# Patient Record
Sex: MALE | Race: ASIAN | NOT HISPANIC OR LATINO | URBAN - METROPOLITAN AREA
[De-identification: names, ages, dates, MRNs, and addresses within clinical notes are randomized per-mention and may not be internally consistent; named-entity substitution may affect disease eponyms.]

---

## 2024-09-24 ENCOUNTER — OUTPATIENT (OUTPATIENT)
Dept: OUTPATIENT SERVICES | Facility: HOSPITAL | Age: 79
LOS: 1 days | End: 2024-09-24
Payer: MEDICARE

## 2024-09-24 ENCOUNTER — APPOINTMENT (OUTPATIENT)
Dept: VASCULAR SURGERY | Facility: CLINIC | Age: 79
End: 2024-09-24
Payer: MEDICARE

## 2024-09-24 VITALS
BODY MASS INDEX: 23.23 KG/M2 | SYSTOLIC BLOOD PRESSURE: 167 MMHG | HEIGHT: 67 IN | HEART RATE: 81 BPM | DIASTOLIC BLOOD PRESSURE: 79 MMHG | WEIGHT: 148 LBS

## 2024-09-24 DIAGNOSIS — I71.43 INFRARENAL ABDOMINAL AORTIC ANEURYSM, WITHOUT RUPTURE: ICD-10-CM

## 2024-09-24 PROBLEM — Z00.00 ENCOUNTER FOR PREVENTIVE HEALTH EXAMINATION: Status: ACTIVE | Noted: 2024-09-24

## 2024-09-24 PROCEDURE — 93005 ELECTROCARDIOGRAM TRACING: CPT

## 2024-09-24 PROCEDURE — 93010 ELECTROCARDIOGRAM REPORT: CPT

## 2024-09-24 PROCEDURE — 99204 OFFICE O/P NEW MOD 45 MIN: CPT

## 2024-09-25 LAB
ALBUMIN SERPL ELPH-MCNC: 4.6 G/DL
ALP BLD-CCNC: 61 U/L
ALT SERPL-CCNC: 12 U/L
ANION GAP SERPL CALC-SCNC: 12 MMOL/L
APTT BLD: 29.8 SEC
AST SERPL-CCNC: 20 U/L
BILIRUB SERPL-MCNC: 0.5 MG/DL
BUN SERPL-MCNC: 24 MG/DL
CALCIUM SERPL-MCNC: 10 MG/DL
CHLORIDE SERPL-SCNC: 108 MMOL/L
CO2 SERPL-SCNC: 26 MMOL/L
CREAT SERPL-MCNC: 0.85 MG/DL
EGFR: 88 ML/MIN/1.73M2
GLUCOSE SERPL-MCNC: 107 MG/DL
HCT VFR BLD CALC: 43 %
HGB BLD-MCNC: 13.5 G/DL
INR PPP: 1 RATIO
MCHC RBC-ENTMCNC: 31.4 GM/DL
MCHC RBC-ENTMCNC: 31.9 PG
MCV RBC AUTO: 101.7 FL
PLATELET # BLD AUTO: 148 K/UL
POTASSIUM SERPL-SCNC: 5.6 MMOL/L
PROT SERPL-MCNC: 7.2 G/DL
PT BLD: 11.9 SEC
RBC # BLD: 4.23 M/UL
RBC # FLD: 13.3 %
SODIUM SERPL-SCNC: 146 MMOL/L
WBC # FLD AUTO: 5.82 K/UL

## 2024-09-25 RX ORDER — CRANBERRY FRUIT EXTRACT 200 MG
CAPSULE ORAL
Refills: 0 | Status: ACTIVE | COMMUNITY

## 2024-09-25 NOTE — HISTORY OF PRESENT ILLNESS
[FreeTextEntry1] : 79yoM w/PMHx of prostate CA (evaluated w/annual PET/CT scans), HTN (not treated w/meds), TB infection as a child, no smoking hx, referred by Dr. Gomez for evaluation of an infrarenal AAA that has demonstrated growth over the past 2y based on PET/CT, CT, and duplex.  Pt denies any abd/back pain and reports no issues w/ambulating long distances/negotiating stairs/hills.  Pt states that the aneurysm was previously measured at 2cm in 2017 but has now grown to 4.4cm on duplex in September 2024.

## 2024-09-25 NOTE — ADDENDUM
[FreeTextEntry1] : This note was written by Syd Heath, acting as a scribe for Dr. Berna Rodriguez.  I, Dr. Berna Rodriguez, have read and attest that all the information, medical decision-making, and discharge instructions within are true and accurate.  I, Dr. Berna Rodriguez, personally performed the evaluation and management (E/M) services for this new patient.  That E/M includes conducting the initial examination, assessing all conditions, and establishing the plan of care.  Today, my ACP, Syd Heath, was here to observe my evaluation and management services for this patient to be followed going forward.

## 2024-09-25 NOTE — ASSESSMENT
[FreeTextEntry1] : 79yoM w/PMHx of prostate CA (evaluated w/annual PET/CT scans), HTN (not treated w/meds), TB infection as a child, no smoking hx, referred by Dr. Gomez for evaluation of an infrarenal AAA that has demonstrated growth over the past 2y based on PET/CT, CT, and duplex.  Pt denies any abd/back pain and reports no issues w/ambulating long distances/negotiating stairs/hills.  Pt states that the aneurysm was previously measured at 2cm in 2017 but has now grown to 4.4cm on duplex in September 2024.  Nontender, pulsatile mass noted on exam today w/strong palpable LE pulses.  Discussed options w/pt as his aneurysm has demonstrated growth on serial imaging studies.  While aneurysm does not meet size criteria for repair, it has grown approximately 2.5cm over the past 7y and 5mm over the past year.  Explained to pt that his aneurysm is amenable to endovascular repair under GETA, which would likely provide the lowest-risk option to the pt.  He will discuss repair w/Dr. Gomez who states that pt is a good surgical candidate from a vascular standpoint and contact our office w/a decision to proceed.

## 2024-09-25 NOTE — REASON FOR VISIT
[Consultation] : a consultation visit [Spouse] : spouse [Source: ______] : History obtained from [unfilled] [FreeTextEntry1] : Asymptomatic AAA w/growth

## 2024-10-06 NOTE — H&P ADULT - NSICDXPASTMEDICALHX_GEN_ALL_CORE_FT
PAST MEDICAL HISTORY:  AAA (abdominal aortic aneurysm)     HTN (hypertension)     Prostate cancer

## 2024-10-06 NOTE — H&P ADULT - ASSESSMENT
79yoM w/PMHx of prostate CA, HTN, remote TB infection, 4.4cm AAA. Presents today for EVAR    Plan  Admit to Dr. Rodriguez, Telemetry  Ancef keira-op 24hrs  CLD, adv if tolerating  Flat for 2 hours, bedrest overnight  d/c barlow at midnight  SQH if groins soft

## 2024-10-06 NOTE — H&P ADULT - HISTORY OF PRESENT ILLNESS
79yoM w/PMHx of prostate CA (evaluated w/annual PET/CT scans), HTN (not treated w/meds), TB infection as a child, no smoking hx, referred by Dr. Gomez for evaluation of an infrarenal AAA that has demonstrated growth over the past 2y based on PET/CT, CT, and duplex. Pt denies any abd/back pain and reports no issues w/ambulating long distances/negotiating stairs/hills. Pt states that the aneurysm was previously measured at 2cm in 2017 but has now grown to 4.4cm on duplex in September 2024. Nontender, pulsatile mass noted on exam today w/strong palpable LE pulses. Discussed options w/pt as his aneurysm has demonstrated growth on serial imaging studies. While aneurysm does not meet size criteria for repair, it has grown approximately 2.5cm over the past 7y and 5mm over the past year. Today he presents for EVAR.

## 2024-10-07 ENCOUNTER — APPOINTMENT (OUTPATIENT)
Dept: VASCULAR SURGERY | Facility: HOSPITAL | Age: 79
End: 2024-10-07

## 2024-10-07 ENCOUNTER — INPATIENT (INPATIENT)
Facility: HOSPITAL | Age: 79
LOS: 0 days | Discharge: HOME | End: 2024-10-08
Attending: SURGERY | Admitting: SURGERY
Payer: MEDICARE

## 2024-10-07 VITALS — HEIGHT: 67 IN | WEIGHT: 147.93 LBS

## 2024-10-07 LAB
ANION GAP SERPL CALC-SCNC: 9 MMOL/L — SIGNIFICANT CHANGE UP (ref 5–17)
APTT BLD: 30 SEC — SIGNIFICANT CHANGE UP (ref 24.5–35.6)
BLD GP AB SCN SERPL QL: NEGATIVE — SIGNIFICANT CHANGE UP
BUN SERPL-MCNC: 16 MG/DL — SIGNIFICANT CHANGE UP (ref 7–23)
CALCIUM SERPL-MCNC: 8.1 MG/DL — LOW (ref 8.4–10.5)
CHLORIDE SERPL-SCNC: 109 MMOL/L — HIGH (ref 96–108)
CO2 SERPL-SCNC: 23 MMOL/L — SIGNIFICANT CHANGE UP (ref 22–31)
CREAT SERPL-MCNC: 0.7 MG/DL — SIGNIFICANT CHANGE UP (ref 0.5–1.3)
EGFR: 93 ML/MIN/1.73M2 — SIGNIFICANT CHANGE UP
GLUCOSE SERPL-MCNC: 107 MG/DL — HIGH (ref 70–99)
HCT VFR BLD CALC: 35.2 % — LOW (ref 39–50)
HGB BLD-MCNC: 11.5 G/DL — LOW (ref 13–17)
INR BLD: 1.14 — SIGNIFICANT CHANGE UP (ref 0.85–1.16)
ISTAT ACTK (ACTIVATED CLOTTING TIME KAOLIN): 207 SEC — HIGH (ref 74–137)
ISTAT ACTK (ACTIVATED CLOTTING TIME KAOLIN): 220 SEC — HIGH (ref 74–137)
ISTAT ACTK (ACTIVATED CLOTTING TIME KAOLIN): 238 SEC — HIGH (ref 74–137)
MAGNESIUM SERPL-MCNC: 1.9 MG/DL — SIGNIFICANT CHANGE UP (ref 1.6–2.6)
MCHC RBC-ENTMCNC: 30.9 PG — SIGNIFICANT CHANGE UP (ref 27–34)
MCHC RBC-ENTMCNC: 32.7 GM/DL — SIGNIFICANT CHANGE UP (ref 32–36)
MCV RBC AUTO: 94.6 FL — SIGNIFICANT CHANGE UP (ref 80–100)
NRBC # BLD: 0 /100 WBCS — SIGNIFICANT CHANGE UP (ref 0–0)
PHOSPHATE SERPL-MCNC: 2.6 MG/DL — SIGNIFICANT CHANGE UP (ref 2.5–4.5)
PLATELET # BLD AUTO: 149 K/UL — LOW (ref 150–400)
POTASSIUM SERPL-MCNC: 4.2 MMOL/L — SIGNIFICANT CHANGE UP (ref 3.5–5.3)
POTASSIUM SERPL-SCNC: 4.2 MMOL/L — SIGNIFICANT CHANGE UP (ref 3.5–5.3)
PROTHROM AB SERPL-ACNC: 13.1 SEC — SIGNIFICANT CHANGE UP (ref 9.9–13.4)
RBC # BLD: 3.72 M/UL — LOW (ref 4.2–5.8)
RBC # FLD: 12.3 % — SIGNIFICANT CHANGE UP (ref 10.3–14.5)
RH IG SCN BLD-IMP: POSITIVE — SIGNIFICANT CHANGE UP
SODIUM SERPL-SCNC: 141 MMOL/L — SIGNIFICANT CHANGE UP (ref 135–145)
WBC # BLD: 7.11 K/UL — SIGNIFICANT CHANGE UP (ref 3.8–10.5)
WBC # FLD AUTO: 7.11 K/UL — SIGNIFICANT CHANGE UP (ref 3.8–10.5)

## 2024-10-07 PROCEDURE — 34705 EVAC RPR A-BIILIAC NDGFT: CPT | Mod: GC

## 2024-10-07 PROCEDURE — 34713 PERQ ACCESS & CLSR FEM ART: CPT | Mod: 50,GC

## 2024-10-07 RX ORDER — ATORVASTATIN CALCIUM 10 MG/1
20 TABLET, FILM COATED ORAL AT BEDTIME
Refills: 0 | Status: DISCONTINUED | OUTPATIENT
Start: 2024-10-07 | End: 2024-10-08

## 2024-10-07 RX ORDER — ONDANSETRON HCL/PF 4 MG/2 ML
4 VIAL (ML) INJECTION ONCE
Refills: 0 | Status: DISCONTINUED | OUTPATIENT
Start: 2024-10-07 | End: 2024-10-08

## 2024-10-07 RX ORDER — OXYCODONE HYDROCHLORIDE 30 MG/1
5 TABLET, FILM COATED, EXTENDED RELEASE ORAL EVERY 6 HOURS
Refills: 0 | Status: DISCONTINUED | OUTPATIENT
Start: 2024-10-07 | End: 2024-10-08

## 2024-10-07 RX ORDER — ASPIRIN 325 MG
81 TABLET ORAL DAILY
Refills: 0 | Status: DISCONTINUED | OUTPATIENT
Start: 2024-10-07 | End: 2024-10-08

## 2024-10-07 RX ORDER — ACETAMINOPHEN 325 MG
650 TABLET ORAL EVERY 6 HOURS
Refills: 0 | Status: DISCONTINUED | OUTPATIENT
Start: 2024-10-07 | End: 2024-10-08

## 2024-10-07 RX ORDER — FENTANYL CITRATE-0.9 % NACL/PF 300MCG/30
25 PATIENT CONTROLLED ANALGESIA VIAL INJECTION
Refills: 0 | Status: DISCONTINUED | OUTPATIENT
Start: 2024-10-07 | End: 2024-10-08

## 2024-10-07 RX ORDER — CEFAZOLIN SODIUM 1 G
2000 VIAL (EA) INJECTION EVERY 8 HOURS
Refills: 0 | Status: COMPLETED | OUTPATIENT
Start: 2024-10-07 | End: 2024-10-08

## 2024-10-07 RX ORDER — INFLUENZA VIRUS VACCINE 15; 15; 15; 15 UG/.5ML; UG/.5ML; UG/.5ML; UG/.5ML
0.5 SUSPENSION INTRAMUSCULAR ONCE
Refills: 0 | Status: DISCONTINUED | OUTPATIENT
Start: 2024-10-07 | End: 2024-10-08

## 2024-10-07 RX ORDER — SODIUM CHLORIDE IRRIG SOLUTION 0.9 %
1000 SOLUTION, IRRIGATION IRRIGATION
Refills: 0 | Status: DISCONTINUED | OUTPATIENT
Start: 2024-10-07 | End: 2024-10-07

## 2024-10-07 RX ADMIN — Medication 100 MILLILITER(S): at 19:24

## 2024-10-07 RX ADMIN — Medication 81 MILLIGRAM(S): at 22:02

## 2024-10-07 RX ADMIN — Medication 100 MILLIGRAM(S): at 23:44

## 2024-10-07 RX ADMIN — Medication 25 MICROGRAM(S): at 20:10

## 2024-10-07 RX ADMIN — ATORVASTATIN CALCIUM 20 MILLIGRAM(S): 10 TABLET, FILM COATED ORAL at 22:02

## 2024-10-07 RX ADMIN — Medication 25 MICROGRAM(S): at 19:38

## 2024-10-07 NOTE — BRIEF OPERATIVE NOTE - NSICDXBRIEFPREOP_GEN_ALL_CORE_FT
PRE-OP DIAGNOSIS:  History of abdominal aortic aneurysm (AAA) 07-Oct-2024 20:40:12  Henrique Lanier

## 2024-10-07 NOTE — PRE-ANESTHESIA EVALUATION ADULT - NSANTHADDINFOFT_GEN_ALL_CORE
Blood products transfusion, Arterial line, possible central line, possible post-[id number] ventilator support discussed

## 2024-10-07 NOTE — PATIENT PROFILE ADULT - FALL HARM RISK - HARM RISK INTERVENTIONS

## 2024-10-07 NOTE — BRIEF OPERATIVE NOTE - OPERATION/FINDINGS
EVAR: Terumo Treo Device  Bilateral groin access L: 12Fr (perclosed x 2) R: 18Fr (perclosed x 2)  Terumo Treo Limbs: Ipsi/Contra: 15mm x 100mm x2   Extension Cuff: 24mm x 40  Main Body: 24mm x 110  No endoloeak at completion of case.   Palpable pulses

## 2024-10-07 NOTE — PROGRESS NOTE ADULT - SUBJECTIVE AND OBJECTIVE BOX
Vascular Surgery Post-Op Note    Procedure: EVAR    Diagnosis/Indication: AAA    Surgeon: Dr. Rodriguez    S: Pt has no complaints. Denies CP, SOB, DYER, calf tenderness. Pain controlled with medication.    O:  T(C): 36.5 (10-07-24 @ 18:13), Max: 36.5 (10-07-24 @ 18:13)  T(F): 97.7 (10-07-24 @ 18:13), Max: 97.7 (10-07-24 @ 18:13)  HR: 62 (10-07-24 @ 20:55) (52 - 65)  BP: 144/68 (10-07-24 @ 20:55) (70/- - 158/72)  RR: 15 (10-07-24 @ 20:55) (15 - 17)  SpO2: 98% (10-07-24 @ 20:55) (97% - 100%)  Wt(kg): --                        11.5   7.11  )-----------( 149      ( 07 Oct 2024 18:27 )             35.2     10-07    141  |  109[H]  |  16  ----------------------------<  107[H]  4.2   |  23  |  0.70    Ca    8.1[L]      07 Oct 2024 18:27  Phos  2.6     10-07  Mg     1.9     10-07        Gen: NAD, resting comfortably in bed  C/V: NSR  Pulm: Nonlabored breathing, no respiratory distress, on room air  Abd: soft, NT/ND  Extrem: WWP, no calf edema, b/l groin dressings c/d/i, soft, no palpable hematoms  Pulses: palpable DP/PT bilaterally      A/P: 79yMale s/p above procedure  bedrest overnight  dc barlow at MN    Diet: CLD  IVF: LR at 100cc/hr  Pain/nausea control  DVT ppx: holding will restart in AM  Dispo plan: 5 uris

## 2024-10-07 NOTE — PACU DISCHARGE NOTE - NAUSEA/VOMITING:
Dr. Gonda notified of pt's Na of 117. Fluids switched from NS@75 to 1/2NS @ 75 and sliding scale insulin protocol initiated.   None

## 2024-10-07 NOTE — PRE-ANESTHESIA EVALUATION ADULT - NSANTHOSAYNRD_GEN_A_CORE
No. NAEL screening performed.  STOP BANG Legend: 0-2 = LOW Risk; 3-4 = INTERMEDIATE Risk; 5-8 = HIGH Risk

## 2024-10-08 ENCOUNTER — TRANSCRIPTION ENCOUNTER (OUTPATIENT)
Age: 79
End: 2024-10-08

## 2024-10-08 VITALS
OXYGEN SATURATION: 96 % | DIASTOLIC BLOOD PRESSURE: 68 MMHG | RESPIRATION RATE: 16 BRPM | SYSTOLIC BLOOD PRESSURE: 147 MMHG | HEART RATE: 77 BPM

## 2024-10-08 PROBLEM — I10 ESSENTIAL (PRIMARY) HYPERTENSION: Chronic | Status: ACTIVE | Noted: 2024-10-06

## 2024-10-08 PROBLEM — C61 MALIGNANT NEOPLASM OF PROSTATE: Chronic | Status: ACTIVE | Noted: 2024-10-06

## 2024-10-08 LAB
ANION GAP SERPL CALC-SCNC: 13 MMOL/L — SIGNIFICANT CHANGE UP (ref 5–17)
BUN SERPL-MCNC: 20 MG/DL — SIGNIFICANT CHANGE UP (ref 7–23)
CALCIUM SERPL-MCNC: 8.8 MG/DL — SIGNIFICANT CHANGE UP (ref 8.4–10.5)
CHLORIDE SERPL-SCNC: 106 MMOL/L — SIGNIFICANT CHANGE UP (ref 96–108)
CO2 SERPL-SCNC: 22 MMOL/L — SIGNIFICANT CHANGE UP (ref 22–31)
CREAT SERPL-MCNC: 0.76 MG/DL — SIGNIFICANT CHANGE UP (ref 0.5–1.3)
EGFR: 91 ML/MIN/1.73M2 — SIGNIFICANT CHANGE UP
GLUCOSE SERPL-MCNC: 115 MG/DL — HIGH (ref 70–99)
HCT VFR BLD CALC: 35 % — LOW (ref 39–50)
HGB BLD-MCNC: 11.7 G/DL — LOW (ref 13–17)
MAGNESIUM SERPL-MCNC: 1.9 MG/DL — SIGNIFICANT CHANGE UP (ref 1.6–2.6)
MCHC RBC-ENTMCNC: 32.6 PG — SIGNIFICANT CHANGE UP (ref 27–34)
MCHC RBC-ENTMCNC: 33.4 GM/DL — SIGNIFICANT CHANGE UP (ref 32–36)
MCV RBC AUTO: 97.5 FL — SIGNIFICANT CHANGE UP (ref 80–100)
NRBC # BLD: 0 /100 WBCS — SIGNIFICANT CHANGE UP (ref 0–0)
PHOSPHATE SERPL-MCNC: 3.7 MG/DL — SIGNIFICANT CHANGE UP (ref 2.5–4.5)
PLATELET # BLD AUTO: 138 K/UL — LOW (ref 150–400)
POTASSIUM SERPL-MCNC: 3.9 MMOL/L — SIGNIFICANT CHANGE UP (ref 3.5–5.3)
POTASSIUM SERPL-SCNC: 3.9 MMOL/L — SIGNIFICANT CHANGE UP (ref 3.5–5.3)
RBC # BLD: 3.59 M/UL — LOW (ref 4.2–5.8)
RBC # FLD: 12.3 % — SIGNIFICANT CHANGE UP (ref 10.3–14.5)
SODIUM SERPL-SCNC: 141 MMOL/L — SIGNIFICANT CHANGE UP (ref 135–145)
WBC # BLD: 7.62 K/UL — SIGNIFICANT CHANGE UP (ref 3.8–10.5)
WBC # FLD AUTO: 7.62 K/UL — SIGNIFICANT CHANGE UP (ref 3.8–10.5)

## 2024-10-08 PROCEDURE — 99233 SBSQ HOSP IP/OBS HIGH 50: CPT

## 2024-10-08 RX ORDER — ATORVASTATIN CALCIUM 10 MG/1
1 TABLET, FILM COATED ORAL
Qty: 30 | Refills: 0
Start: 2024-10-08

## 2024-10-08 RX ORDER — PSYLLIUM HUSK 0.4 G
400 CAPSULE ORAL ONCE
Refills: 0 | Status: COMPLETED | OUTPATIENT
Start: 2024-10-08 | End: 2024-10-08

## 2024-10-08 RX ADMIN — Medication 100 MILLIGRAM(S): at 07:32

## 2024-10-08 RX ADMIN — Medication 400 MILLIGRAM(S): at 08:48

## 2024-10-08 RX ADMIN — Medication 40 MILLIEQUIVALENT(S): at 08:49

## 2024-10-08 RX ADMIN — Medication 5000 UNIT(S): at 05:35

## 2024-10-08 NOTE — DISCHARGE NOTE NURSING/CASE MANAGEMENT/SOCIAL WORK - NSDCPEFALRISK_GEN_ALL_CORE
For information on Fall & Injury Prevention, visit: [email or web address] OR  [email or web address] OR  https://www.cdc.gov/steadi/patient.html

## 2024-10-08 NOTE — DISCHARGE NOTE PROVIDER - NSDCFUSCHEDAPPT_GEN_ALL_CORE_FT
Berna Rodriguez  [facility]  VASCULAR [address]  Scheduled Appointment: 10/22/2024

## 2024-10-08 NOTE — CONSULT NOTE ADULT - ASSESSMENT
78 yo M with a PMH of prostate cancer (monitored annually with PET), HTN not on any medications, and remote TB infection as a child who presented due to growth of AAA, now s/p EVAR on 10/7.     #Infrarenal AAA  -s/p EVAR with vascular surgery on 10/7   -started asa 81 mg daily and atorvastatin 20 mg daily   -pain mgmt per vascular team     #HTN  -In the hospital/perioperative setting has had SBP mostly in the 120-140 range   -Follow up with PCP for ongoing monitoring to determine whether pressures are still elevated in the outpatient setting     #Mild thrombocytopenia   -Likely iso of being keira-operative]  -Continue to trend, if worsening can consider CBC in blue top tube, blood smear     #Mild normocytic anemia   -Encourage follow up with PCP to ensure age appropriate cancer screenings have taken place     DVT ppx: SQH

## 2024-10-08 NOTE — DISCHARGE NOTE PROVIDER - HOSPITAL COURSE
79yoM w/PMHx of prostate CA (evaluated w/annual PET/CT scans), HTN (not treated w/meds), TB infection as a child, no smoking hx, referred by Dr. Gomez for evaluation of an infrarenal AAA that has demonstrated growth over the past 2y based on PET/CT, CT, and duplex. Pt denies any abd/back pain and reports no issues w/ambulating long distances/negotiating stairs/hills. Pt states that the aneurysm was previously measured at 2cm in 2017 but has now grown to 4.4cm on duplex in September 2024. Nontender, pulsatile mass noted on exam today w/strong palpable LE pulses. Discussed options w/pt as his aneurysm has demonstrated growth on serial imaging studies. While aneurysm does not meet size criteria for repair, it has grown approximately 2.5cm over the past 7y and 5mm over the past year. Explained to pt that his aneurysm is amenable to endovascular repair under GETA, which would likely provide the lowest-risk option to the   pt. He will discuss repair w/Dr. Gomez who states that pt is a good surgical candidate from a vascular standpoint and contact our office w/a decision to proceed. On 10/24 Patient underwent EVAR, tolerated procedure well, post op course unremarkable, passed trial of void, afebrile, VSS and ambulating without difficulties. Patient medically ready for discharge home today.

## 2024-10-08 NOTE — DISCHARGE NOTE NURSING/CASE MANAGEMENT/SOCIAL WORK - PATIENT PORTAL LINK FT
You can access the FollowMyHealth Patient Portal offered by [facility] by registering at the following website: [email or web address]. By joining [facility]’s FollowMyHealth portal, you will also be able to view your health information using other applications (apps) compatible with our system.

## 2024-10-08 NOTE — DISCHARGE NOTE PROVIDER - CARE PROVIDER_API CALL
Berna Rodriguez  Vascular Surgery  [address]  [place] [address]  Phone: [phone]  Fax: [phone]  Follow Up Time:    Berna Rodriguez  Vascular Surgery  [address]  [place] [address]  Phone: [phone]  Fax: [phone]  Scheduled Appointment: 10/22/2024 09:15 AM

## 2024-10-08 NOTE — CONSULT NOTE ADULT - SUBJECTIVE AND OBJECTIVE BOX
HPI:   78 yo M with a PMH of prostate cancer (monitored by annual PET-CT), HTN not on any medications, and childhood TB infection who presented after being referred for evaluation of an infrarenal AAA which has grown quickly over the past 2 years. Patient is asymptomatic and has not had any back or chest pain, also denies having abdominal pain. Most recent duplex in September 2024 showed that the aneurysm had grown to 4.4 cm from 2 cm in 2017. Given speed of growth, he was admitted to the vascular service for EVAR. EVAR performed on 10/7.     Feels well this morning, has mild pain at the access sites for the procedure yesterday but otherwise not having any symptoms.     MEDICATIONS  (STANDING):  aspirin  chewable 81 milliGRAM(s) Oral daily  atorvastatin 20 milliGRAM(s) Oral at bedtime  heparin   Injectable 5000 Unit(s) SubCutaneous every 8 hours  influenza  Vaccine (HIGH DOSE) 0.5 milliLiter(s) IntraMuscular once    MEDICATIONS  (PRN):  acetaminophen     Tablet .. 650 milliGRAM(s) Oral every 6 hours PRN Mild Pain (1 - 3)  fentaNYL    Injectable 25 MICROGram(s) IV Push every 5 minutes PRN Moderate Pain (4 - 6)  ondansetron Injectable 4 milliGRAM(s) IV Push once PRN Nausea and/or Vomiting  oxyCODONE    IR 5 milliGRAM(s) Oral every 6 hours PRN Severe Pain (7 - 10)    CAPILLARY BLOOD GLUCOSE        I&O's Summary    07 Oct 2024 07:01  -  08 Oct 2024 07:00  --------------------------------------------------------  IN: 300 mL / OUT: 975 mL / NET: -675 mL    08 Oct 2024 07:01  -  08 Oct 2024 10:26  --------------------------------------------------------  IN: 180 mL / OUT: 0 mL / NET: 180 mL        PHYSICAL EXAM:  Vital Signs Last 24 Hrs  T(C): 36.6 (08 Oct 2024 08:47), Max: 36.7 (08 Oct 2024 04:50)  T(F): 97.8 (08 Oct 2024 08:47), Max: 98.1 (08 Oct 2024 04:50)  HR: 90 (08 Oct 2024 08:47) (52 - 90)  BP: 141/66 (08 Oct 2024 08:47) (70/- - 163/78)  BP(mean): 95 (08 Oct 2024 08:47) (84 - 112)  RR: 16 (08 Oct 2024 08:47) (15 - 19)  SpO2: 95% (08 Oct 2024 08:47) (94% - 100%)    Parameters below as of 08 Oct 2024 08:47  Patient On (Oxygen Delivery Method): room air    EXAM:   Appears comfortable   MMM  Normal WOB on RA, CTAB   RRR, no mrg   Abdomen soft, nontender, nondistended. Clean dressings bilaterally to the groin.  Extremities warm and without edema   AOX3, no focal neuro deficits     LABS:                        11.7   7.62  )-----------( 138      ( 08 Oct 2024 05:30 )             35.0     10-08    141  |  106  |  20  ----------------------------<  115[H]  3.9   |  22  |  0.76    Ca    8.8      08 Oct 2024 05:30  Phos  3.7     10-08  Mg     1.9     10-08      PT/INR - ( 07 Oct 2024 18:27 )   PT: 13.1 sec;   INR: 1.14          PTT - ( 07 Oct 2024 18:27 )  PTT:30.0 sec      Urinalysis Basic - ( 08 Oct 2024 05:30 )    Color: x / Appearance: x / SG: x / pH: x  Gluc: 115 mg/dL / Ketone: x  / Bili: x / Urobili: x   Blood: x / Protein: x / Nitrite: x   Leuk Esterase: x / RBC: x / WBC x   Sq Epi: x / Non Sq Epi: x / Bacteria: x            RADIOLOGY & ADDITIONAL TESTS:  Imaging from Last 24 Hours:  No new imaging

## 2024-10-08 NOTE — PROGRESS NOTE ADULT - SUBJECTIVE AND OBJECTIVE BOX
s/p EVAR, 17F R 12F L perclose x2 for 4.4cm aneurysm, post op labs wnl, poc wnl, HLIV, adv diet, SQH for AM, cain ventura at [place]        ---------------------------------------------------------------------------  PLEASE CHECK WHEN PRESENT:     [  ]Heart Failure     [  ] Acute     [  ] Acute on Chronic     [  ] Chronic  -------------------------------------------------------------------     [  ]Diastolic [HFpEF]     [  ]Systolic [HFrEF]     [  ]Combined [HFpEF & HFrEF]  .................................................................................     [  ]Other:     [ ] Pulmonary Hypertension     [ ] Chronic A-fib     [ ] Persistet A-fib     [ ] Permanent A-fib     [ ] Paroxysmal A-fib     [ x] Hypertensive Heart Disease  -------------------------------------------------------------------  [ ] Respiratory failure  [ ] Acute cor pulmonale  [ ] Asthma/COPD Exacerbation  [ ]COPD on home O2 (Chronic renal Failure)   [ ] Pleural effusion  [ ] Aspiration pneumonia  [ ] Obstructive Sleep Apnea  [ ]Atelectasis   [ ] Acute PE   -------------------------------------------------------------------  [  ]Acute Kidney Injury      [  ]Acute Tubular Necrosis      [  ]Reneal Medullary Necrosis     [  ]Renal Cortical Necrosis     [  ]Other Pathological Lesions:    [  ]CKD 1  [  ]CKD 2  [  ]CKD 3  [  ]CKD 4  [  ]CKD 5 (ESRD)  [  ]Other  -------------------------------------------------------------------  [  ]Diabetes  [  ] Diabetic PVD Ulcer  [  ] Neuropathic ulcer to DM  [  ] Diabetes with Nephropathy  [  ] Osteomyelitis due to diabetes  [  ] Hyperglycemia   [  ]hypoglycemia   --------------------------------------------------------------------  [  ]Malnutrition: See Nutrition Note  [  ]Cachexia  [  ]Other:   [  ]Supplement Ordered:  [  ]Morbid Obesity (BMI >=40]  [ ] Ileus  ---------------------------------------------------------------------  [ ] Sepsis/severe sepsis/septic shock  [ ] Noninfectious SIRS  [ ] UTI  [ ] Pneumonia  [ ] Thrombophlebitis     -----------------------------------------------------------------------  [ ] Acidosis/alkalosis  [ ] Fluid overload  [ ] Hypokalemia  [ ] Hyperkalemia  [ ] Hypomagnesemia  [ ] Hypophosphatemia  [ ] Hyperphosphatemia  ------------------------------------------------------------------------  [ ] Acute blood loss anemia  [ ] Post op blood loss anemia  [ ] Iron deficiency anemia  [ ] Anemia due to chronic disease  [ ] Hypercoagulable state  [ ] Thrombocytopenia  ----------------------------------------------------------------------  [ ] Cerebral infarction  [ ] Transient ischemia attack  [ ] Encephalopathy - Toxic or Metabolic    A/P: 79yoM w/PMHx of prostate CA (evaluated w/annual PET/CT scans), HTN (not treated w/meds), TB infection as a child, no smoking hx, referred by Dr. Gomez for evaluation of an infrarenal AAA that has demonstrated growth over the past 2y based on PET/CT, CT, and duplex. Pt denies any abd/back pain and reports no issues w/ambulating long distances/negotiating stairs/hills. Pt states that the aneurysm was previously measured at 2cm in 2017 but has now grown to 4.4cm on duplex in September 2024. Nontender, pulsatile mass noted on exam today w/strong palpable LE pulses. Discussed options w/pt as his aneurysm has demonstrated growth on serial imaging studies. While aneurysm does not meet size criteria for repair, it has grown approximately 2.5cm over the past 7y and 5mm over the past year. S/p EVAR 10/7.    Vascular/AAA:  - s/p EVAR 10/7  - periop ancef  - pain/nausea control  - pending TOV  - c/w aspirin/statin (new meds)    HTN:  - not on home BP meds  - f/u BP    Diet: DASH  Activity: as tolerated  DVT PPX: SQH  Dispo: 5 uris                 ON: s/p EVAR, 17F R 11F L perclose x2 for 4.4cm aneurysm, post op labs wnl, poc wnl, HLIV, adv diet, SQH for AM, cain ventura at [place]  Subjective:     ROS:   Denies Headache, blurred vision, Chest Pain, SOB, Abdominal pain, nausea or vomiting     Social   ceFAZolin   IVPB 2000  aspirin  chewable 81  ceFAZolin   IVPB 2000  heparin   Injectable 5000      Allergies    No Known Allergies    Intolerances        Vital Signs Last 24 Hrs  T(C): 36.7 (08 Oct 2024 04:50), Max: 36.7 (08 Oct 2024 04:50)  T(F): 98.1 (08 Oct 2024 04:50), Max: 98.1 (08 Oct 2024 04:50)  HR: 64 (08 Oct 2024 04:50) (52 - 68)  BP: 126/61 (08 Oct 2024 04:50) (70/- - 163/78)  BP(mean): 84 (08 Oct 2024 04:50) (84 - 112)  RR: 17 (08 Oct 2024 04:50) (15 - 19)  SpO2: 98% (08 Oct 2024 04:50) (94% - 100%)    Parameters below as of 08 Oct 2024 04:50  Patient On (Oxygen Delivery Method): room air      I&O's Summary    07 Oct 2024 07:01  -  08 Oct 2024 06:18  --------------------------------------------------------  IN: 300 mL / OUT: 750 mL / NET: -450 mL        Physical Exam:      LABS:                        11.5   7.11  )-----------( 149      ( 07 Oct 2024 18:27 )             35.2     10-07    141  |  109[H]  |  16  ----------------------------<  107[H]  4.2   |  23  |  0.70    Ca    8.1[L]      07 Oct 2024 18:27  Phos  2.6     10-07  Mg     1.9     10-07      PT/INR - ( 07 Oct 2024 18:27 )   PT: 13.1 sec;   INR: 1.14          PTT - ( 07 Oct 2024 18:27 )  PTT:30.0 sec    Radiology and Additional Studies:    ---------------------------------------------------------------------------  PLEASE CHECK WHEN PRESENT:     [  ]Heart Failure     [  ] Acute     [  ] Acute on Chronic     [  ] Chronic  -------------------------------------------------------------------     [  ]Diastolic [HFpEF]     [  ]Systolic [HFrEF]     [  ]Combined [HFpEF & HFrEF]  .................................................................................     [  ]Other:     [ ] Pulmonary Hypertension     [ ] Chronic A-fib     [ ] Persistet A-fib     [ ] Permanent A-fib     [ ] Paroxysmal A-fib     [ x] Hypertensive Heart Disease  -------------------------------------------------------------------  [ ] Respiratory failure  [ ] Acute cor pulmonale  [ ] Asthma/COPD Exacerbation  [ ]COPD on home O2 (Chronic renal Failure)   [ ] Pleural effusion  [ ] Aspiration pneumonia  [ ] Obstructive Sleep Apnea  [ ]Atelectasis   [ ] Acute PE   -------------------------------------------------------------------  [  ]Acute Kidney Injury      [  ]Acute Tubular Necrosis      [  ]Reneal Medullary Necrosis     [  ]Renal Cortical Necrosis     [  ]Other Pathological Lesions:    [  ]CKD 1  [  ]CKD 2  [  ]CKD 3  [  ]CKD 4  [  ]CKD 5 (ESRD)  [  ]Other  -------------------------------------------------------------------  [  ]Diabetes  [  ] Diabetic PVD Ulcer  [  ] Neuropathic ulcer to DM  [  ] Diabetes with Nephropathy  [  ] Osteomyelitis due to diabetes  [  ] Hyperglycemia   [  ]hypoglycemia   --------------------------------------------------------------------  [  ]Malnutrition: See Nutrition Note  [  ]Cachexia  [  ]Other:   [  ]Supplement Ordered:  [  ]Morbid Obesity (BMI >=40]  [ ] Ileus  ---------------------------------------------------------------------  [ ] Sepsis/severe sepsis/septic shock  [ ] Noninfectious SIRS  [ ] UTI  [ ] Pneumonia  [ ] Thrombophlebitis     -----------------------------------------------------------------------  [ ] Acidosis/alkalosis  [ ] Fluid overload  [ ] Hypokalemia  [ ] Hyperkalemia  [ ] Hypomagnesemia  [ ] Hypophosphatemia  [ ] Hyperphosphatemia  ------------------------------------------------------------------------  [ ] Acute blood loss anemia  [ ] Post op blood loss anemia  [ ] Iron deficiency anemia  [ ] Anemia due to chronic disease  [ ] Hypercoagulable state  [ ] Thrombocytopenia  ----------------------------------------------------------------------  [ ] Cerebral infarction  [ ] Transient ischemia attack  [ ] Encephalopathy - Toxic or Metabolic    A/P: 79yoM w/PMHx of prostate CA (evaluated w/annual PET/CT scans), HTN (not treated w/meds), TB infection as a child, no smoking hx, referred by Dr. Gomez for evaluation of an infrarenal AAA that has demonstrated growth over the past 2y based on PET/CT, CT, and duplex. Pt denies any abd/back pain and reports no issues w/ambulating long distances/negotiating stairs/hills. Pt states that the aneurysm was previously measured at 2cm in 2017 but has now grown to 4.4cm on duplex in September 2024. Nontender, pulsatile mass noted on exam today w/strong palpable LE pulses. Discussed options w/pt as his aneurysm has demonstrated growth on serial imaging studies. While aneurysm does not meet size criteria for repair, it has grown approximately 2.5cm over the past 7y and 5mm over the past year. S/p EVAR 10/7.    Vascular/AAA:  - s/p EVAR 10/7  - periop ancef  - pain/nausea control  - pending TOV  - c/w aspirin/statin (new meds)    HTN:  - not on home BP meds  - f/u BP    Diet: DASH  Activity: as tolerated  DVT PPX: SQH  Dispo: 5 uris                 ON: s/p EVAR, 17F R 11F L perclose x2 for 4.4cm aneurysm, post op labs wnl, poc wnl, HLIV, adv diet, SQH for AM, cain [facility]ura at [place]  Subjective: alert and awake, NAD    ROS:   Denies Headache, blurred vision, Chest Pain, SOB, Abdominal pain, nausea or vomiting     Social   ceFAZolin   IVPB 2000  aspirin  chewable 81  ceFAZolin   IVPB 2000  heparin   Injectable 5000      Allergies  No Known Allergies          Vital Signs Last 24 Hrs  T(C): 36.7 (08 Oct 2024 04:50), Max: 36.7 (08 Oct 2024 04:50)  T(F): 98.1 (08 Oct 2024 04:50), Max: 98.1 (08 Oct 2024 04:50)  HR: 64 (08 Oct 2024 04:50) (52 - 68)  BP: 126/61 (08 Oct 2024 04:50) (70/- - 163/78)  BP(mean): 84 (08 Oct 2024 04:50) (84 - 112)  RR: 17 (08 Oct 2024 04:50) (15 - 19)  SpO2: 98% (08 Oct 2024 04:50) (94% - 100%)    Parameters below as of 08 Oct 2024 04:50  Patient On (Oxygen Delivery Method): room air      I&O's Summary    07 Oct 2024 07:01  -  08 Oct 2024 06:18  --------------------------------------------------------  IN: 300 mL / OUT: 750 mL / NET: -450 mL        Physical Exam:  Gen: NAD, resting comfortably in bed  C/V: NSR  Pulm: Nonlabored breathing, no respiratory distress, on room air  Abd: soft, NT/ND  Extrem: WWP, no calf edema, b/l groin dressings c/d/i, soft, no palpable hematoms  Pulses: palpable DP/PT bilaterally    LABS:                        11.5   7.11  )-----------( 149      ( 07 Oct 2024 18:27 )             35.2     10-07    141  |  109[H]  |  16  ----------------------------<  107[H]  4.2   |  23  |  0.70    Ca    8.1[L]      07 Oct 2024 18:27  Phos  2.6     10-07  Mg     1.9     10-07      PT/INR - ( 07 Oct 2024 18:27 )   PT: 13.1 sec;   INR: 1.14          PTT - ( 07 Oct 2024 18:27 )  PTT:30.0 sec    Radiology and Additional Studies:    ---------------------------------------------------------------------------  PLEASE CHECK WHEN PRESENT:     [  ]Heart Failure     [  ] Acute     [  ] Acute on Chronic     [  ] Chronic  -------------------------------------------------------------------     [  ]Diastolic [HFpEF]     [  ]Systolic [HFrEF]     [  ]Combined [HFpEF & HFrEF]  .................................................................................     [  ]Other:     [ ] Pulmonary Hypertension     [ ] Chronic A-fib     [ ] Persistet A-fib     [ ] Permanent A-fib     [ ] Paroxysmal A-fib     [ x] Hypertensive Heart Disease  -------------------------------------------------------------------  [ ] Respiratory failure  [ ] Acute cor pulmonale  [ ] Asthma/COPD Exacerbation  [ ]COPD on home O2 (Chronic renal Failure)   [ ] Pleural effusion  [ ] Aspiration pneumonia  [ ] Obstructive Sleep Apnea  [ ]Atelectasis   [ ] Acute PE   -------------------------------------------------------------------  [  ]Acute Kidney Injury      [  ]Acute Tubular Necrosis      [  ]Reneal Medullary Necrosis     [  ]Renal Cortical Necrosis     [  ]Other Pathological Lesions:    [  ]CKD 1  [  ]CKD 2  [  ]CKD 3  [  ]CKD 4  [  ]CKD 5 (ESRD)  [  ]Other  -------------------------------------------------------------------  [  ]Diabetes  [  ] Diabetic PVD Ulcer  [  ] Neuropathic ulcer to DM  [  ] Diabetes with Nephropathy  [  ] Osteomyelitis due to diabetes  [  ] Hyperglycemia   [  ]hypoglycemia   --------------------------------------------------------------------  [  ]Malnutrition: See Nutrition Note  [  ]Cachexia  [  ]Other:   [  ]Supplement Ordered:  [  ]Morbid Obesity (BMI >=40]  [ ] Ileus  ---------------------------------------------------------------------  [ ] Sepsis/severe sepsis/septic shock  [ ] Noninfectious SIRS  [ ] UTI  [ ] Pneumonia  [ ] Thrombophlebitis     -----------------------------------------------------------------------  [ ] Acidosis/alkalosis  [ ] Fluid overload  [ ] Hypokalemia  [ ] Hyperkalemia  [ ] Hypomagnesemia  [ ] Hypophosphatemia  [ ] Hyperphosphatemia  ------------------------------------------------------------------------  [ ] Acute blood loss anemia  [ ] Post op blood loss anemia  [ ] Iron deficiency anemia  [ ] Anemia due to chronic disease  [ ] Hypercoagulable state  [ ] Thrombocytopenia  ----------------------------------------------------------------------  [ ] Cerebral infarction  [ ] Transient ischemia attack  [ ] Encephalopathy - Toxic or Metabolic    A/P: 79yoM w/PMHx of prostate CA (evaluated w/annual PET/CT scans), HTN (not treated w/meds), TB infection as a child, no smoking hx, referred by Dr. Gomez for evaluation of an infrarenal AAA that has demonstrated growth over the past 2y based on PET/CT, CT, and duplex. Pt denies any abd/back pain and reports no issues w/ambulating long distances/negotiating stairs/hills. Pt states that the aneurysm was previously measured at 2cm in 2017 but has now grown to 4.4cm on duplex in September 2024. Nontender, pulsatile mass noted on exam today w/strong palpable LE pulses. Discussed options w/pt as his aneurysm has demonstrated growth on serial imaging studies. While aneurysm does not meet size criteria for repair, it has grown approximately 2.5cm over the past 7y and 5mm over the past year. S/p EVAR 10/7.    Vascular/AAA:  - s/p EVAR 10/7  - periop ancef  - pain/nausea control  - pending TOV  - c/w aspirin/statin (new meds)    HTN:  - not on home BP meds  - f/u BP    Diet: DASH  Activity: as tolerated  DVT PPX: SQH  Dispo: d/c home today

## 2024-10-08 NOTE — DISCHARGE NOTE PROVIDER - NSDCCPCAREPLAN_GEN_ALL_CORE_FT
PRINCIPAL DISCHARGE DIAGNOSIS  Diagnosis: Aneurysm, abdominal aortic  Assessment and Plan of Treatment:       SECONDARY DISCHARGE DIAGNOSES  Diagnosis: Essential hypertension  Assessment and Plan of Treatment:     Diagnosis: CA of prostate  Assessment and Plan of Treatment:

## 2024-10-08 NOTE — CONSULT NOTE ADULT - TIME BILLING
Review of hospital course, labs, vitals, medical records.   Bedside exam and interview   Discussed plan of care with primary team ACP and housestaff   Documenting the encounter  Excludes teaching and separately reported services

## 2024-10-08 NOTE — DISCHARGE NOTE PROVIDER - NSDCFUADDINST_GEN_ALL_CORE_FT
FOLLOW UP: Dr. Rodriguez in 1 week. Your appointment has been made for _______. Call the office at  with any questions.  WOUND CARE: You may shower; soap and water over incision sites. Do not scrub. Pat dry when done.   ACTIVITY: Ambulate as tolerated, but no heavy lifting (>10lbs) or strenuous exercise. NO sharp neck turns. NO driving until you see surgeon in office.  If you have a persistent headache that is not improved with Tylenol, please call MD.  DIET: You may resume regular diet.   Call the office if you experience increasing pain, redness, swelling or drainage from incision sites/wounds, or temperature >101.4F.   NEW MEDICATIONS: Atorvastatin 20 mg daily, Please  the new medication at [facility] located in the lobby of the hospital.   ADDITIONAL FOLLOW UP AFTER DISCHARGE: follow up with your PCP in a week  DISCHARGE DESTINATION: Home  Discharge Education provided: Yes  FOLLOW UP: Dr. Rodriguez in 1 week. Your appointment has been made for  10/22/24 at 9:15am. Call the office at  with any questions.  WOUND CARE: You may shower; soap and water over incision sites. Do not scrub. Pat dry when done.   ACTIVITY: Ambulate as tolerated, but no heavy lifting (>10lbs) or strenuous exercise. NO sharp neck turns. NO driving until you see surgeon in office.  If you have a persistent headache that is not improved with Tylenol, please call MD.  DIET: You may resume regular diet.   Call the office if you experience increasing pain, redness, swelling or drainage from incision sites/wounds, or temperature >101.4F.   NEW MEDICATIONS: Atorvastatin 20 mg daily, Please  the new medication at [facility] located in the lobby of the hospital.   ADDITIONAL FOLLOW UP AFTER DISCHARGE: follow up with your PCP in a week  DISCHARGE DESTINATION: Home  Discharge Education provided: Yes

## 2024-10-08 NOTE — DISCHARGE NOTE PROVIDER - PROVIDER TOKENS
PROVIDER:[TOKEN:[[id number]]] PROVIDER:[TOKEN:[[id number]],SCHEDULEDAPPT:[10/22/2024],SCHEDULEDAPPTTIME:[09:15 AM]]

## 2024-10-09 PROBLEM — I71.40 ABDOMINAL AORTIC ANEURYSM, WITHOUT RUPTURE, UNSPECIFIED: Chronic | Status: ACTIVE | Noted: 2024-10-06

## 2024-10-10 DIAGNOSIS — C61 MALIGNANT NEOPLASM OF PROSTATE: ICD-10-CM

## 2024-10-10 DIAGNOSIS — I10 ESSENTIAL (PRIMARY) HYPERTENSION: ICD-10-CM

## 2024-10-10 DIAGNOSIS — D69.6 THROMBOCYTOPENIA, UNSPECIFIED: ICD-10-CM

## 2024-10-10 DIAGNOSIS — I71.43 INFRARENAL ABDOMINAL AORTIC ANEURYSM, WITHOUT RUPTURE: ICD-10-CM

## 2024-10-10 DIAGNOSIS — D64.9 ANEMIA, UNSPECIFIED: ICD-10-CM

## 2024-10-10 DIAGNOSIS — Z86.11 PERSONAL HISTORY OF TUBERCULOSIS: ICD-10-CM

## 2024-10-15 PROCEDURE — C1874: CPT

## 2024-10-15 PROCEDURE — 80048 BASIC METABOLIC PNL TOTAL CA: CPT

## 2024-10-15 PROCEDURE — C1760: CPT

## 2024-10-15 PROCEDURE — 83735 ASSAY OF MAGNESIUM: CPT

## 2024-10-15 PROCEDURE — 86900 BLOOD TYPING SEROLOGIC ABO: CPT

## 2024-10-15 PROCEDURE — 85610 PROTHROMBIN TIME: CPT

## 2024-10-15 PROCEDURE — C1894: CPT

## 2024-10-15 PROCEDURE — 85347 COAGULATION TIME ACTIVATED: CPT

## 2024-10-15 PROCEDURE — 85730 THROMBOPLASTIN TIME PARTIAL: CPT

## 2024-10-15 PROCEDURE — C1887: CPT

## 2024-10-15 PROCEDURE — 86901 BLOOD TYPING SEROLOGIC RH(D): CPT

## 2024-10-15 PROCEDURE — 76000 FLUOROSCOPY <1 HR PHYS/QHP: CPT

## 2024-10-15 PROCEDURE — C1889: CPT

## 2024-10-15 PROCEDURE — C1725: CPT

## 2024-10-15 PROCEDURE — C2628: CPT

## 2024-10-15 PROCEDURE — 84100 ASSAY OF PHOSPHORUS: CPT

## 2024-10-15 PROCEDURE — 86850 RBC ANTIBODY SCREEN: CPT

## 2024-10-15 PROCEDURE — 85027 COMPLETE CBC AUTOMATED: CPT

## 2024-10-15 PROCEDURE — 36415 COLL VENOUS BLD VENIPUNCTURE: CPT

## 2024-10-15 PROCEDURE — C1769: CPT

## 2024-10-22 ENCOUNTER — APPOINTMENT (OUTPATIENT)
Dept: VASCULAR SURGERY | Facility: CLINIC | Age: 79
End: 2024-10-22
Payer: MEDICARE

## 2024-10-22 DIAGNOSIS — I71.011: ICD-10-CM

## 2024-10-22 PROCEDURE — 99024 POSTOP FOLLOW-UP VISIT: CPT

## 2024-10-22 PROCEDURE — 93978 VASCULAR STUDY: CPT

## 2024-10-29 PROBLEM — I71.011 AORTIC ARCH DISSECTION: Status: ACTIVE | Noted: 2024-10-29
